# Patient Record
Sex: FEMALE | Race: AMERICAN INDIAN OR ALASKA NATIVE | NOT HISPANIC OR LATINO | Employment: FULL TIME | URBAN - METROPOLITAN AREA
[De-identification: names, ages, dates, MRNs, and addresses within clinical notes are randomized per-mention and may not be internally consistent; named-entity substitution may affect disease eponyms.]

---

## 2017-10-16 ENCOUNTER — HOSPITAL ENCOUNTER (OUTPATIENT)
Dept: RADIOLOGY | Facility: MEDICAL CENTER | Age: 47
End: 2017-10-16
Attending: FAMILY MEDICINE
Payer: COMMERCIAL

## 2017-10-16 DIAGNOSIS — Z12.31 ENCOUNTER FOR SCREENING MAMMOGRAM FOR HIGH-RISK PATIENT: ICD-10-CM

## 2017-10-16 PROCEDURE — G0202 SCR MAMMO BI INCL CAD: HCPCS

## 2017-10-20 ENCOUNTER — HOSPITAL ENCOUNTER (OUTPATIENT)
Dept: RADIOLOGY | Facility: MEDICAL CENTER | Age: 47
End: 2017-10-20
Attending: FAMILY MEDICINE
Payer: COMMERCIAL

## 2017-10-20 DIAGNOSIS — E04.9 NON-TOXIC GOITER: ICD-10-CM

## 2017-10-20 PROCEDURE — 76536 US EXAM OF HEAD AND NECK: CPT

## 2018-01-05 ENCOUNTER — OFFICE VISIT (OUTPATIENT)
Dept: ENDOCRINOLOGY | Facility: MEDICAL CENTER | Age: 48
End: 2018-01-05
Payer: COMMERCIAL

## 2018-01-05 VITALS
HEART RATE: 100 BPM | OXYGEN SATURATION: 89 % | DIASTOLIC BLOOD PRESSURE: 86 MMHG | WEIGHT: 213.2 LBS | BODY MASS INDEX: 34.27 KG/M2 | SYSTOLIC BLOOD PRESSURE: 126 MMHG | HEIGHT: 66 IN

## 2018-01-05 DIAGNOSIS — E53.8 VITAMIN B12 DEFICIENCY: ICD-10-CM

## 2018-01-05 DIAGNOSIS — E55.9 VITAMIN D DEFICIENCY: ICD-10-CM

## 2018-01-05 DIAGNOSIS — E03.9 HYPOTHYROIDISM, UNSPECIFIED TYPE: ICD-10-CM

## 2018-01-05 DIAGNOSIS — E11.65 UNCONTROLLED TYPE 2 DIABETES MELLITUS WITH HYPERGLYCEMIA, WITH LONG-TERM CURRENT USE OF INSULIN (HCC): ICD-10-CM

## 2018-01-05 DIAGNOSIS — Z79.4 UNCONTROLLED TYPE 2 DIABETES MELLITUS WITH HYPERGLYCEMIA, WITH LONG-TERM CURRENT USE OF INSULIN (HCC): ICD-10-CM

## 2018-01-05 DIAGNOSIS — E04.2 GOITER, NONTOXIC, MULTINODULAR: ICD-10-CM

## 2018-01-05 LAB
HBA1C MFR BLD: 8.2 % (ref ?–5.8)
INT CON NEG: NORMAL
INT CON POS: NORMAL

## 2018-01-05 PROCEDURE — 83036 HEMOGLOBIN GLYCOSYLATED A1C: CPT | Performed by: INTERNAL MEDICINE

## 2018-01-05 PROCEDURE — 99205 OFFICE O/P NEW HI 60 MIN: CPT | Performed by: INTERNAL MEDICINE

## 2018-01-05 RX ORDER — DAPAGLIFLOZIN AND METFORMIN HYDROCHLORIDE 5; 1000 MG/1; MG/1
2 TABLET, FILM COATED, EXTENDED RELEASE ORAL DAILY
Qty: 60 TAB | Refills: 3 | COMMUNITY
Start: 2018-01-05 | End: 2018-01-22

## 2018-01-05 NOTE — PROGRESS NOTES
New Patient Consult Note  Primary care physician: Alison Cabrera M.D.    Reason for consult: Multinodular goiter    HPI:  Jud Pascal is a 47 y.o. old patient who comes in today for evaluation of multinodular goiter. Her thyroid ultrasound done in October 2017 shows for subcentimeter thyroid nodules (2 on left and 2 on right). She denies any family history of thyroid cancer. She denies any history of radiation to head and neck area.     She does complain of hair loss progressively getting worse over the period of time. She does have hot flashes on and off which are also progressively getting worse over a period of time. Her menstrual cycles are sporadic now. Most recent menstrual cycle was 3 months ago.  Also complains of mental sluggishness and foggy brain sensation.    She also has uncontrolled type 2 diabetes. She is currently on metformin 2 g per day along with most likely Tresiba as the basal insulin(18 units once daily). She is not very regular in checking her blood sugars but whenever she checks in the fasting it is usually around 180-200  Mg/dl range.    ROS:  Constitutional: Hair loss, hot flashes, No weight loss  Cardiac: No palpitations or racing heart  Resp: No shortness of breath  Neuro: mental sluggishness, foggy brain sensation, No numbness or tinging in feet  Endo: No heat or cold intolerance, no polyuria or polydipsia  All other systems were reviewed and were negative.    Past Medical History:  There are no active problems to display for this patient.      Past Surgical History:  Past Surgical History:   Procedure Laterality Date   • MAMMOPLASTY AUGMENTATION  8/26/2011    Performed by JOSE STEVENSON at SURGERY [facility] ORS   • HYSTEROSCOPY WITH VIDEO OPERATIVE  1/28/2011    Performed by NEAL PATEL at SURGERY [facility] ORS   • CHOLECYSTECTOMY  1991   • PB ENLARGE BREAST WITH IMPLANT         Allergies:  Ibuprofen; Other environmental; and Penicillins    Social  History:  Social History     Social History   • Marital status:      Spouse name: N/A   • Number of children: N/A   • Years of education: N/A     Occupational History   • Not on file.     Social History Main Topics   • Smoking status: Never Smoker   • Smokeless tobacco: Never Used   • Alcohol use Yes      Comment: weekly   occassional   • Drug use: No   • Sexual activity: Not on file     Other Topics Concern   • Not on file     Social History Narrative   • No narrative on file       Family History:  Family History   Problem Relation Age of Onset   • Diabetes     • Heart Disease     • Hypertension     • Cancer Maternal Aunt    • Breast Cancer Mother    • Breast Cancer Sister        Medications:    Current Outpatient Prescriptions:   •  Dulaglutide (TRULICITY) 0.75 MG/0.5ML Solution Pen-injector, Inject 1 PEN as instructed every 7 days., Disp: 4 PEN, Rfl: 6  •  Dapagliflozin-Metformin HCl ER 5-1000 MG TABLET SR 24 HR, Take 2 tablet by mouth every day., Disp: 60 Tab, Rfl: 3  •  lisinopril (PRINIVIL) 5 MG TABS, Take 5 mg by mouth every day., Disp: , Rfl:   •  hydrocodone-acetaminophen (NORCO) 7.5-325 MG per tablet, Take 1-2 Tabs by mouth every 8 hours as needed (pain)., Disp: 20 Tab, Rfl: 0  •  ondansetron (ZOFRAN ODT) 4 MG TBDP, Take 1 Tab by mouth every 8 hours as needed for Nausea/Vomiting., Disp: 20 Tab, Rfl: 0  •  cyclobenzaprine (FLEXERIL) 10 MG TABS, Take 1 Tab by mouth 3 times a day as needed for Mild Pain., Disp: 30 Each, Rfl: 0  •  hydrocodone-acetaminophen (NORCO) 5-325 MG TABS per tablet, Take 1-2 Tabs by mouth every 6 hours as needed., Disp: 20 Each, Rfl: 0  •  vitamin D, Ergocalciferol, (DRISDOL) 79842 UNIT CAPS capsule, Take  by mouth. 3x per week , Disp: , Rfl:   •  Ferrous Sulfate (IRON) 325 (65 FE) MG TABS, Take  by mouth every day. Dose unknown , Disp: , Rfl:   •  Ascorbic Acid (VITAMIN C PO), Take 1 Tab by mouth., Disp: , Rfl:   •  CALCIUM-VITAMIN D PO, Take 50,000 Units by mouth every Monday,  "Wednesday, and Friday. Indications: 3 times a week, Disp: , Rfl:     Labs: Reviewed    Physical Examination:  Vital signs: /86   Pulse 100   Ht 1.676 m (5' 6\")   Wt 96.7 kg (213 lb 3.2 oz)   SpO2 89%   BMI 34.41 kg/m²  Body mass index is 34.41 kg/m².  General: No apparent distress, cooperative  Eyes: No scleral icterus or discharge  ENMT: Normal on external inspection of nose, lips, normal thyroid exam  Neck: No abnormal masses on inspection  Resp: Normal effort, clear to auscultation bilaterally   CVS: Regular rate and rhythm, S1 S2 normal, no murmur   Extremities: No edema  Abdomen: abdominal obesity present  Neuro: Alert and oriented  Skin: No rash  Psych: Normal mood and affect, intact memory and able to make informed decisions    Assessment and Plan:    1. Hypothyroidism, unspecified type  Ago detailed thyroid panel to rule out hypothyroidism as per below.  - FREE THYROXINE; Future  - TSH; Future  - TRIIDOTHYRONINE; Future  - TRIIDOTHYRONINE; Future  - THYROID PEROXIDASE  (TPO) AB; Future    2. Vitamin D deficiency  Rule out vitamin D deficiency as the contributory factor for her hair loss.  - VITAMIN D,25 HYDROXY; Future    3. Vitamin B12 deficiency  Rule out vitamin B12 deficiency as the contributory factor for alertness.  - VITAMIN B12; Future    4. Uncontrolled type 2 diabetes mellitus with hyperglycemia, with long-term current use of insulin (CMS-HCC)  Her blood sugars are uncontrolled. Start  - Dulaglutide (TRULICITY) 0.75 MGevery 7 days. First dose given in the office.  - Dapagliflozin-Metformin HCl ER 5-1000 MG TABLET SR 24 HR; Take 2 tablet by mouth every day.     Samples of the above medication was also given to the patient.     Stop:  Metformin 1 g twice daily  Reduce tresiba to 10 units sc once daily.    We will plan to gradually titrate down and discontinued insulin over the period of next few days as the blood sugars starts to get better.     5. Goiter, nontoxic, multinodular  No " intervention is required at the current time. We'll repeat ultrasound sometime in April 2018 to monitor the size and progression of these thyroid nodules.    Return in about 2 weeks (around 1/19/2018).    Total face to face time spent with patient equals 60 minutes. 35/60 minutes were spent on counseling the patient about the pathophysiology of pituitary-thyroid axis, natural history of thyroid nodules, role of thyroid hormone, , Vit D and Vit B12.  Also counseled the patient on pathophysiology of type 2 diabetes, 8 core defects and how the chosen regimen attacks all the 8 core defects. Side effects and benefits of the GLP-1 therapy and SGLT-2 inhibition was discussed in detail with the patient.     Thank you for allowing me to participate in the care of this patient.    Chase Stephens M.D.  01/05/18    CC:   Alison Cabrera M.D.    This note was created using voice recognition software (Dragon). The accuracy of the dictation is limited by the abilities of the software. I have reviewed the note prior to signing, however some errors in grammar and context are still possible. If you have any questions related to this note please do not hesitate to contact our office.

## 2018-01-06 LAB
25(OH)D3+25(OH)D2 SERPL-MCNC: 20.5 NG/ML (ref 30–100)
T3FREE SERPL-MCNC: 3.3 PG/ML (ref 2–4.4)
T4 FREE SERPL-MCNC: 1.21 NG/DL (ref 0.82–1.77)
THYROPEROXIDASE AB SERPL-ACNC: 15 IU/ML (ref 0–34)
TSH SERPL DL<=0.005 MIU/L-ACNC: 1.39 UIU/ML (ref 0.45–4.5)
VIT B12 SERPL-MCNC: 609 PG/ML (ref 232–1245)

## 2018-01-22 ENCOUNTER — OFFICE VISIT (OUTPATIENT)
Dept: ENDOCRINOLOGY | Facility: MEDICAL CENTER | Age: 48
End: 2018-01-22
Payer: COMMERCIAL

## 2018-01-22 VITALS
WEIGHT: 208 LBS | BODY MASS INDEX: 33.43 KG/M2 | HEIGHT: 66 IN | HEART RATE: 92 BPM | OXYGEN SATURATION: 97 % | DIASTOLIC BLOOD PRESSURE: 70 MMHG | SYSTOLIC BLOOD PRESSURE: 106 MMHG

## 2018-01-22 DIAGNOSIS — E55.9 VITAMIN D DEFICIENCY: ICD-10-CM

## 2018-01-22 DIAGNOSIS — E04.9 GOITER: ICD-10-CM

## 2018-01-22 DIAGNOSIS — Z79.4 UNCONTROLLED TYPE 2 DIABETES MELLITUS WITH HYPERGLYCEMIA, WITH LONG-TERM CURRENT USE OF INSULIN (HCC): ICD-10-CM

## 2018-01-22 DIAGNOSIS — E11.65 UNCONTROLLED TYPE 2 DIABETES MELLITUS WITH HYPERGLYCEMIA, WITH LONG-TERM CURRENT USE OF INSULIN (HCC): ICD-10-CM

## 2018-01-22 PROCEDURE — 99215 OFFICE O/P EST HI 40 MIN: CPT | Performed by: INTERNAL MEDICINE

## 2018-01-22 RX ORDER — DAPAGLIFLOZIN AND METFORMIN HYDROCHLORIDE 10; 1000 MG/1; MG/1
1 TABLET, FILM COATED, EXTENDED RELEASE ORAL DAILY
Qty: 30 TAB | Refills: 3 | Status: SHIPPED | OUTPATIENT
Start: 2018-01-22 | End: 2018-02-12 | Stop reason: SDUPTHER

## 2018-01-22 RX ORDER — ERGOCALCIFEROL 1.25 MG/1
50000 CAPSULE ORAL
Qty: 12 CAP | Refills: 3 | Status: SHIPPED | OUTPATIENT
Start: 2018-01-22

## 2018-01-22 RX ORDER — M-VIT,TX,IRON,MINS/CALC/FOLIC 27MG-0.4MG
1 TABLET ORAL DAILY
COMMUNITY

## 2018-01-22 NOTE — PROGRESS NOTES
Endocrinology Clinic Progress Note  PCP: Alison Cabrera M.D.    HPI:  Jud Pascal is a 47 y.o. old patient who comes in today for routine follow up of Management of Uncontrolled Type 2 Diabetes and multinodular goiter    HPI:  Jud Pascal is a 47 y.o. old patient who comes in today for evaluation of above stated problem.    Most Recent HbA1c:   Lab Results   Component Value Date/Time    HBA1C 8.2 01/05/2018 09:48 AM        Current Diabetes Regimen:  GLP-1 Agent: Dulaglutide 0.75 mg once weekly   Metformin + SGLT-2 Inhibitor: Xigduo XR 5/1000 mg 2 daily   Soliqua 10 units at bedtime  Testing blood sugars three times daily.  Did not bring log book but states running 118-130  Hypoglycemia:  None    ROS:  Constitutional: She has lost 5 pounds in the last 2 weeks.  Cardiac: No palpitations or racing heart  Resp: No shortness of breath  Neuro: No numbness or tinging in feet  Endo: No heat or cold intolerance, no polyuria or polydipsia  All other systems were reviewed and were negative.    Past Medical History:  There are no active problems to display for this patient.      Past Surgical History:  Past Surgical History:   Procedure Laterality Date   • MAMMOPLASTY AUGMENTATION  8/26/2011    Performed by JOSE STEVENSON at SURGERY [facility] ORS   • HYSTEROSCOPY WITH VIDEO OPERATIVE  1/28/2011    Performed by NEAL PATEL at SURGERY [facility] ORS   • CHOLECYSTECTOMY  1991   • PB ENLARGE BREAST WITH IMPLANT         Allergies:  Ibuprofen; Other environmental; and Penicillins    Social History:  Social History     Social History   • Marital status:      Spouse name: N/A   • Number of children: N/A   • Years of education: N/A     Occupational History   • Not on file.     Social History Main Topics   • Smoking status: Never Smoker   • Smokeless tobacco: Never Used   • Alcohol use Yes      Comment: weekly   occassional   • Drug use: No   • Sexual activity: Not on file     Other Topics Concern  "  • Not on file     Social History Narrative   • No narrative on file       Family History:  Family History   Problem Relation Age of Onset   • Diabetes     • Heart Disease     • Hypertension     • Cancer Maternal Aunt    • Breast Cancer Mother    • Breast Cancer Sister        Medications:    Current Outpatient Prescriptions:   •  Cholecalciferol (VITAMIN D3) 5000 units Cap, Take 1 Cap by mouth every day., Disp: , Rfl:   •  therapeutic multivitamin-minerals (THERAGRAN-M) Tab, Take 1 Tab by mouth every day., Disp: , Rfl:   •  Dapagliflozin-Metformin HCl ER (XIGDUO XR)  MG TABLET SR 24 HR, Take 1 tablet by mouth every day., Disp: 30 Tab, Rfl: 3  •  vitamin D, Ergocalciferol, (DRISDOL) 81274 units Cap capsule, Take 1 Cap by mouth every 7 days., Disp: 12 Cap, Rfl: 3  •  Dulaglutide (TRULICITY) 0.75 MG/0.5ML Solution Pen-injector, Inject 1 PEN as instructed every 7 days., Disp: 4 PEN, Rfl: 6  •  Ferrous Sulfate (IRON) 325 (65 FE) MG TABS, Take  by mouth every day. Dose unknown , Disp: , Rfl:   •  lisinopril (PRINIVIL) 5 MG TABS, Take 5 mg by mouth every day., Disp: , Rfl:   •  cyclobenzaprine (FLEXERIL) 10 MG TABS, Take 1 Tab by mouth 3 times a day as needed for Mild Pain., Disp: 30 Each, Rfl: 0    Labs: Reviewed    Physical Examination:  Vital signs: /70   Pulse 92   Ht 1.676 m (5' 6\")   Wt 94.3 kg (208 lb)   SpO2 97%   BMI 33.57 kg/m²  Body mass index is 33.57 kg/m².  General: No apparent distress, cooperative  Eyes: No scleral icterus or discharge  ENMT: Normal on external inspection of nose, lips, normal thyroid exam  Neck: No abnormal masses on inspection  Resp: Normal effort, clear to auscultation bilaterally   CVS: Regular rate and rhythm, S1 S2 normal, no murmur   Extremities: No edema  Abdomen: abdominal obesity present  Neuro: Alert and oriented  Skin: No rash  Psych: Normal mood and affect, intact memory and able to make informed decisions    Foot Exam:  Monofilament: done  Monofilament " testing with a 10 gram force: sensation intact: intact bilaterally  Visual Inspection: Feet without maceration, ulcers, fissures.  Pedal pulses: intact bilaterally    Assessment and Plan:    1. Uncontrolled type 2 diabetes mellitus without complication, without long-term current use of insulin (CMS-[facility])  Continue current regimen. Can stop soliqua. Her blood sugars are likely to be well controlled on trulicity and xigduo only. Will consider increasing the dose of trulicity in next visit to 1.5 mg weekly if she tolerates 0.75 mg weekly well.     - Discussed diabetic diet discussed in detail-plate method.  - She will test before meals and log   - She will walk for 20-30 minutes daily.  - Reviewed medications and advised how to take   - Discussed importance of immunizations and yearly eye exams.   - Encouraged patient to attend diabetes education program.  - Advised daily foot exams. Educated on signs of infection.   - Educational material distributed.   - Educated on need to stay well hydrated with water.  - Educated to call with any questions or problems.    2. Goiter  Stable.     3. HTN: well-controlled; will consider stopping lisinopril in next visit if her BP stays good. She has no other indication for taking lisinopril as she has no evidence of micro-microalbuminuria.    Return in about 2 weeks (around 2/5/2018).    Total face to face time spent with patient equals 40 minutes. 30/40 minutes were spent on counseling the patient about the mechanism of action, side effects and benefits of GLP-1 therapy(specifically trulicity as she had some questions or concerns),  SGLT-Inhibitor therapy again. I had similar discussion in her first visit with me but it seems that she did not remember much from that visit. I also counseled the patient on hypoglycemia recognition and management. She had missed her dose of trulicity as she was awaiting some clarification, so we gave her due dose in the office.     Thank you for allowing  me to participate in the care of this patient.    Dr. Chase Stephens  01/22/18    CC:   Alison Cabrera M.D.    This note was created using voice recognition software (Dragon). The accuracy of the dictation is limited by the abilities of the software. I have reviewed the note prior to signing, however some errors in grammar and context are still possible. If you have any questions related to this note please do not hesitate to contact our office.

## 2018-01-24 ENCOUNTER — TELEPHONE (OUTPATIENT)
Dept: ENDOCRINOLOGY | Facility: MEDICAL CENTER | Age: 48
End: 2018-01-24

## 2018-02-12 ENCOUNTER — OFFICE VISIT (OUTPATIENT)
Dept: ENDOCRINOLOGY | Facility: MEDICAL CENTER | Age: 48
End: 2018-02-12
Payer: COMMERCIAL

## 2018-02-12 VITALS
WEIGHT: 208 LBS | BODY MASS INDEX: 33.43 KG/M2 | OXYGEN SATURATION: 95 % | SYSTOLIC BLOOD PRESSURE: 122 MMHG | HEIGHT: 66 IN | DIASTOLIC BLOOD PRESSURE: 80 MMHG | HEART RATE: 94 BPM

## 2018-02-12 DIAGNOSIS — E55.9 VITAMIN D DEFICIENCY: ICD-10-CM

## 2018-02-12 DIAGNOSIS — E11.9 TYPE 2 DIABETES MELLITUS WITHOUT COMPLICATION, WITHOUT LONG-TERM CURRENT USE OF INSULIN (HCC): ICD-10-CM

## 2018-02-12 DIAGNOSIS — Z79.4 UNCONTROLLED TYPE 2 DIABETES MELLITUS WITH HYPERGLYCEMIA, WITH LONG-TERM CURRENT USE OF INSULIN (HCC): ICD-10-CM

## 2018-02-12 DIAGNOSIS — E11.65 UNCONTROLLED TYPE 2 DIABETES MELLITUS WITH HYPERGLYCEMIA, WITH LONG-TERM CURRENT USE OF INSULIN (HCC): ICD-10-CM

## 2018-02-12 PROCEDURE — 99214 OFFICE O/P EST MOD 30 MIN: CPT | Performed by: INTERNAL MEDICINE

## 2018-02-12 RX ORDER — DAPAGLIFLOZIN AND METFORMIN HYDROCHLORIDE 10; 1000 MG/1; MG/1
1 TABLET, FILM COATED, EXTENDED RELEASE ORAL DAILY
Qty: 30 TAB | Refills: 3 | Status: SHIPPED | OUTPATIENT
Start: 2018-02-12 | End: 2018-03-27 | Stop reason: CLARIF

## 2018-02-12 NOTE — PROGRESS NOTES
Endocrinology Clinic Progress Note  PCP: Alison Cabrera M.D.    HPI:  Jud Pascal is a 47 y.o. old patient who comes in today for routine follow up of Management of Uncontrolled Type 2 Diabetes     HPI:  Jud Pascal is a 47 y.o. old patient who comes in today for evaluation of above stated problem.    Most Recent HbA1c:   Lab Results   Component Value Date/Time    HBA1C 8.2 01/05/2018 09:48 AM        Current Diabetes Regimen:  GLP-1 Agent: Dulaglutide 0.75 mg once weekly     Metformin + SGLT-2 Inhibitor: dapagliflozin-metformin XR(10/1000 mg) once daily.   Testing blood sugar 2-3 times daily    Before Breakfast:  130's    After Lunch:  124-128    Before Bedtime:  130's    Hypoglycemia:  None    ROS:  Constitutional: No weight loss  Cardiac: No palpitations or racing heart  Resp: No shortness of breath  Neuro: No numbness or tinging in feet  Endo: No heat or cold intolerance, no polyuria or polydipsia  All other systems were reviewed and were negative.    Past Medical History:  There are no active problems to display for this patient.      Past Surgical History:  Past Surgical History:   Procedure Laterality Date   • MAMMOPLASTY AUGMENTATION  8/26/2011    Performed by JOSE STEVENSON at SURGERY [facility] ORS   • HYSTEROSCOPY WITH VIDEO OPERATIVE  1/28/2011    Performed by NEAL PATEL at SURGERY [facility] ORS   • CHOLECYSTECTOMY  1991   • PB ENLARGE BREAST WITH IMPLANT         Allergies:  Ibuprofen; Other environmental; and Penicillins    Social History:  Social History     Social History   • Marital status:      Spouse name: N/A   • Number of children: N/A   • Years of education: N/A     Occupational History   • Not on file.     Social History Main Topics   • Smoking status: Never Smoker   • Smokeless tobacco: Never Used   • Alcohol use Yes      Comment: weekly   occassional   • Drug use: No   • Sexual activity: Not on file     Other Topics Concern   • Not on file     Social  "History Narrative   • No narrative on file       Family History:  Family History   Problem Relation Age of Onset   • Diabetes     • Heart Disease     • Hypertension     • Cancer Maternal Aunt    • Breast Cancer Mother    • Breast Cancer Sister        Medications:    Current Outpatient Prescriptions:   •  Dulaglutide (TRULICITY) 1.5 MG/0.5ML Solution Pen-injector, Inject 0.5 mL as instructed every 7 days., Disp: 4 PEN, Rfl: 6  •  Dapagliflozin-Metformin HCl ER (XIGDUO XR)  MG TABLET SR 24 HR, Take 1 tablet by mouth every day., Disp: 30 Tab, Rfl: 3  •  therapeutic multivitamin-minerals (THERAGRAN-M) Tab, Take 1 Tab by mouth every day., Disp: , Rfl:   •  vitamin D, Ergocalciferol, (DRISDOL) 79490 units Cap capsule, Take 1 Cap by mouth every 7 days., Disp: 12 Cap, Rfl: 3  •  lisinopril (PRINIVIL) 5 MG TABS, Take 5 mg by mouth every day., Disp: , Rfl:   •  cyclobenzaprine (FLEXERIL) 10 MG TABS, Take 1 Tab by mouth 3 times a day as needed for Mild Pain., Disp: 30 Each, Rfl: 0    Labs: Reviewed    Physical Examination:  Vital signs: /80   Pulse 94   Ht 1.676 m (5' 6\")   Wt 94.3 kg (208 lb)   SpO2 95%   BMI 33.57 kg/m²  Body mass index is 33.57 kg/m².  General: No apparent distress, cooperative  Eyes: No scleral icterus or discharge  ENMT: Normal on external inspection of nose, lips, normal thyroid exam  Neck: No abnormal masses on inspection  Resp: Normal effort, clear to auscultation bilaterally   CVS: Regular rate and rhythm, S1 S2 normal, no murmur   Extremities: No edema  Abdomen: abdominal obesity present  Neuro: Alert and oriented  Skin: No rash  Psych: Normal mood and affect, intact memory and able to make informed decisions    Foot Exam:  Monofilament: done  Monofilament testing with a 10 gram force: sensation intact: intact bilaterally  Visual Inspection: Feet without maceration, ulcers, fissures.  Pedal pulses: intact bilaterally    Assessment and Plan:    1. Type 2 diabetes mellitus without " complication, without long-term current use of insulin (CMS-HCC)  Uncontrolled; increase trulicity to 1.5 mg once weekly; first dose was given by  Patient in the office and I supervised it. Her injection technique is good.    -The following was discussed by Iliana Bangura RN CDE  - Discussed diabetic diet discussed in detail-plate method.  She understands that her appetite will decrease with the increase in Trulicity and she will need to eat less.  - She will test before meals and log .  - She will walk for 20-30 minutes daily.  - Reviewed medications and advised how to take. Her Xigduo was denied.  She will call her insurance company to find out why or if she needs to be switched to a different SGLT 2 inhibitor.  - Discussed importance of immunizations and yearly eye exams.   - Encouraged patient to attend diabetes education program.   - Advised daily foot exams. Educated on signs of infection.   - Educational material distributed.   - Educated on need to stay well hydrated with water.  - Educated to call with any questions or problems.    2. Goiter: stable.    3. Hypothyroidism: ruled out.     4. Vit d def: on vitamin D supplementation. Her energy levels have improved a lot.    Return in about 3 weeks (around 3/5/2018).    Thank you for allowing me to participate in the care of this patient.    Dr. Chase Stephens  02/12/18    CC:   Alison Cabrera M.D.    This note was created using voice recognition software (Dragon). The accuracy of the dictation is limited by the abilities of the software. I have reviewed the note prior to signing, however some errors in grammar and context are still possible. If you have any questions related to this note please do not hesitate to contact our office.

## 2018-03-15 ENCOUNTER — TELEPHONE (OUTPATIENT)
Dept: ENDOCRINOLOGY | Facility: MEDICAL CENTER | Age: 48
End: 2018-03-15

## 2018-03-27 ENCOUNTER — OFFICE VISIT (OUTPATIENT)
Dept: ENDOCRINOLOGY | Facility: MEDICAL CENTER | Age: 48
End: 2018-03-27
Payer: COMMERCIAL

## 2018-03-27 VITALS
HEIGHT: 66 IN | HEART RATE: 91 BPM | BODY MASS INDEX: 33.11 KG/M2 | WEIGHT: 206 LBS | OXYGEN SATURATION: 96 % | SYSTOLIC BLOOD PRESSURE: 112 MMHG | DIASTOLIC BLOOD PRESSURE: 70 MMHG

## 2018-03-27 DIAGNOSIS — E11.65 UNCONTROLLED TYPE 2 DIABETES MELLITUS WITH HYPERGLYCEMIA, WITH LONG-TERM CURRENT USE OF INSULIN (HCC): ICD-10-CM

## 2018-03-27 DIAGNOSIS — E55.9 VITAMIN D DEFICIENCY: ICD-10-CM

## 2018-03-27 DIAGNOSIS — Z79.4 UNCONTROLLED TYPE 2 DIABETES MELLITUS WITH HYPERGLYCEMIA, WITH LONG-TERM CURRENT USE OF INSULIN (HCC): ICD-10-CM

## 2018-03-27 DIAGNOSIS — E04.2 GOITER, NONTOXIC, MULTINODULAR: ICD-10-CM

## 2018-03-27 LAB
HBA1C MFR BLD: 6.7 % (ref ?–5.8)
INT CON NEG: NORMAL
INT CON POS: NORMAL

## 2018-03-27 PROCEDURE — 99214 OFFICE O/P EST MOD 30 MIN: CPT | Performed by: INTERNAL MEDICINE

## 2018-03-27 PROCEDURE — 83036 HEMOGLOBIN GLYCOSYLATED A1C: CPT | Performed by: INTERNAL MEDICINE

## 2018-03-27 NOTE — PROGRESS NOTES
Endocrinology Clinic Progress Note  PCP: Alison Cabrera M.D.    HPI:  Jud Pascal is a 48 y.o. old patient who comes in today for routine follow up of Management of Uncontrolled Type 2 Diabetes and Vitamin D Deficiency.  States feeling much better and no longer having brain fog or sluggish.    HPI:  Jud Pascal is a 48 y.o. old patient who comes in today for evaluation of above stated problem.    Most Recent HbA1c:   Lab Results   Component Value Date/Time    HBA1C 6.7 03/27/2018 04:08 PM   HBA1C is down from 8.2 on 1/5/18    Current Diabetes Regimen:    GLP-1 Agent: Trulicity 1.5 mg weekly    Metformin + SGLT-2 Inhibitor: dapagliflozin-metformin XR(10/1000 mg) once daily.     Testing blood sugars daily until she ran out of strips. Testing before dinner now every couple days.    Before Lunch:  130's  Before Dinner:  130's  Hypoglycemia:  None    ROS:  Constitutional: intentional weight loss  Cardiac: No palpitations or racing heart  Resp: No shortness of breath  Neuro: No numbness or tinging in feet  Endo: No heat or cold intolerance, no polyuria or polydipsia  All other systems were reviewed and were negative.    Past Medical History:  There are no active problems to display for this patient.      Past Surgical History:  Past Surgical History:   Procedure Laterality Date   • MAMMOPLASTY AUGMENTATION  8/26/2011    Performed by JOSE STEVENSON at SURGERY [facility] ORS   • HYSTEROSCOPY WITH VIDEO OPERATIVE  1/28/2011    Performed by NEAL PATEL at SURGERY [facility] ORS   • CHOLECYSTECTOMY  1991   • PB ENLARGE BREAST WITH IMPLANT         Allergies:  Ibuprofen; Other environmental; and Penicillins    Social History:  Social History     Social History   • Marital status:      Spouse name: N/A   • Number of children: N/A   • Years of education: N/A     Occupational History   • Not on file.     Social History Main Topics   • Smoking status: Never Smoker   • Smokeless tobacco: Never  "Used   • Alcohol use Yes      Comment: weekly   occassional   • Drug use: No   • Sexual activity: Not on file     Other Topics Concern   • Not on file     Social History Narrative   • No narrative on file       Family History:  Family History   Problem Relation Age of Onset   • Diabetes     • Heart Disease     • Hypertension     • Cancer Maternal Aunt    • Breast Cancer Mother    • Breast Cancer Sister        Medications:    Current Outpatient Prescriptions:   •  Empagliflozin-Metformin HCl ER  MG TABLET SR 24 HR, Take 1 tablet by mouth every day., Disp: 30 Tab, Rfl: 3  •  Dulaglutide (TRULICITY) 1.5 MG/0.5ML Solution Pen-injector, Inject 0.5 mL as instructed every 7 days., Disp: 4 PEN, Rfl: 6  •  therapeutic multivitamin-minerals (THERAGRAN-M) Tab, Take 1 Tab by mouth every day., Disp: , Rfl:   •  vitamin D, Ergocalciferol, (DRISDOL) 62250 units Cap capsule, Take 1 Cap by mouth every 7 days., Disp: 12 Cap, Rfl: 3  •  lisinopril (PRINIVIL) 5 MG TABS, Take 5 mg by mouth every day., Disp: , Rfl:     Labs: Reviewed    Physical Examination:  Vital signs: /70   Pulse 91   Ht 1.676 m (5' 6\")   Wt 93.4 kg (206 lb)   SpO2 96%   BMI 33.25 kg/m²  Body mass index is 33.25 kg/m².  General: No apparent distress, cooperative  Eyes: No scleral icterus or discharge  ENMT: Normal on external inspection of nose, lips, normal thyroid exam  Neck: No abnormal masses on inspection  Resp: Normal effort, clear to auscultation bilaterally   CVS: Regular rate and rhythm, S1 S2 normal, no murmur   Extremities: No edema  Abdomen: abdominal obesity present  Neuro: Alert and oriented  Skin: No rash  Psych: Normal mood and affect, intact memory and able to make informed decisions    Foot Exam:  Monofilament: done  Monofilament testing with a 10 gram force: sensation intact: intact bilaterally  Visual Inspection: Feet without maceration, ulcers, fissures.  Pedal pulses: intact bilaterally    Assessment and Plan:    1. " Uncontrolled type 2 diabetes mellitus with hyperglycemia, with long-term current use of insulin (CMS-[facility])  Relatively well controlled although the goal is to attempt to near normal A1c. Advised her to stop the sodas at night especially.     The following was discussed by Iliana Bangura RN CDE  - Discussed diabetic diet discussed in detail-plate method.  - She will test before meals and log .  - She will walk for 20-30 minutes daily.  - Reviewed medications and advised how to take   - Discussed importance of immunizations and yearly eye exams.    - Advised daily foot exams. Educated on signs of infection.   - Educational material distributed.   - Educated on need to stay well hydrated with water.  - Educated to call with any questions or problems.    2. Vitamin D deficiency  continue current supplementation. We'll repeat levels before next appointment.    3. Goiter, nontoxic, multinodular  Stable.    Return in about 2 months (around 5/27/2018).    Thank you for allowing me to participate in the care of this patient.    Dr. Chase Stephens  03/27/18    CC:   Alison Cabrera M.D.    This note was created using voice recognition software (Dragon). The accuracy of the dictation is limited by the abilities of the software. I have reviewed the note prior to signing, however some errors in grammar and context are still possible. If you have any questions related to this note please do not hesitate to contact our office.

## 2018-05-21 ENCOUNTER — APPOINTMENT (OUTPATIENT)
Dept: ENDOCRINOLOGY | Facility: MEDICAL CENTER | Age: 48
End: 2018-05-21
Payer: COMMERCIAL

## 2018-06-11 ENCOUNTER — APPOINTMENT (OUTPATIENT)
Dept: ENDOCRINOLOGY | Facility: MEDICAL CENTER | Age: 48
End: 2018-06-11
Payer: COMMERCIAL

## 2018-06-11 NOTE — PROGRESS NOTES
Endocrinology Clinic Progress Note  PCP: Alison Cabrera M.D.    HPI:  Jud Pascal is a 48 y.o. old patient who comes in today for routine follow up of Management of Uncontrolled Type 2 Diabetes, Vitamin D Deficiency, and Goiter.    HPI:  Jud Pascal is a 48 y.o. old patient who comes in today for evaluation of above stated problem.    Most Recent HbA1c:   Lab Results   Component Value Date/Time    HBA1C 6.7 03/27/2018 04:08 PM        Current Diabetes Regimen:  GLP-1 Agent: Dulaglutide 1.5 mg once weekly  Metformin + SGLT-2 Inhibitor: Synjardy XR  mg daily    Before Breakfast:  Before Lunch:  Before Dinner:  Before Bedtime:  Other times:  Hypoglycemia:  {NDKHYPOGLYCEMIA:20516}    ROS:  Constitutional: No weight loss  Cardiac: No palpitations or racing heart  Resp: No shortness of breath  Neuro: No numbness or tinging in feet  Endo: No heat or cold intolerance, no polyuria or polydipsia  All other systems were reviewed and were negative.    Past Medical History:  There are no active problems to display for this patient.      Past Surgical History:  Past Surgical History:   Procedure Laterality Date   • MAMMOPLASTY AUGMENTATION  8/26/2011    Performed by JOSE STEVENSON at SURGERY [facility] ORS   • HYSTEROSCOPY WITH VIDEO OPERATIVE  1/28/2011    Performed by NEAL PATEL at SURGERY [facility] ORS   • CHOLECYSTECTOMY  1991   • PB ENLARGE BREAST WITH IMPLANT         Allergies:  Ibuprofen; Other environmental; and Penicillins    Social History:  Social History     Social History   • Marital status:      Spouse name: N/A   • Number of children: N/A   • Years of education: N/A     Occupational History   • Not on file.     Social History Main Topics   • Smoking status: Never Smoker   • Smokeless tobacco: Never Used   • Alcohol use Yes      Comment: weekly   occassional   • Drug use: No   • Sexual activity: Not on file     Other Topics Concern   • Not on file     Social History  Narrative   • No narrative on file       Family History:  Family History   Problem Relation Age of Onset   • Diabetes     • Heart Disease     • Hypertension     • Cancer Maternal Aunt    • Breast Cancer Mother    • Breast Cancer Sister        Medications:    Current Outpatient Prescriptions:   •  Empagliflozin-Metformin HCl ER  MG TABLET SR 24 HR, Take 1 tablet by mouth every day., Disp: 30 Tab, Rfl: 3  •  Dulaglutide (TRULICITY) 1.5 MG/0.5ML Solution Pen-injector, Inject 0.5 mL as instructed every 7 days., Disp: 4 PEN, Rfl: 6  •  therapeutic multivitamin-minerals (THERAGRAN-M) Tab, Take 1 Tab by mouth every day., Disp: , Rfl:   •  vitamin D, Ergocalciferol, (DRISDOL) 85839 units Cap capsule, Take 1 Cap by mouth every 7 days., Disp: 12 Cap, Rfl: 3  •  lisinopril (PRINIVIL) 5 MG TABS, Take 5 mg by mouth every day., Disp: , Rfl:     Labs: Reviewed    Physical Examination:  Vital signs: There were no vitals taken for this visit. There is no height or weight on file to calculate BMI.  General: No apparent distress, cooperative  Eyes: No scleral icterus or discharge  ENMT: Normal on external inspection of nose, lips, normal thyroid exam  Neck: No abnormal masses on inspection  Resp: Normal effort, clear to auscultation bilaterally   CVS: Regular rate and rhythm, S1 S2 normal, no murmur   Extremities: No edema  Abdomen: abdominal obesity present  Neuro: Alert and oriented  Skin: No rash  Psych: Normal mood and affect, intact memory and able to make informed decisions    Foot Exam:  Monofilament: {DONE:79576}  Monofilament testing with a 10 gram force: sensation intact: {Sensation:85304780}  Visual Inspection: Feet {w-w/o:5700} maceration, ulcers, fissures.  Pedal pulses: {Pedal Pulses:33848442}    Assessment and Plan:    1. Uncontrolled type 2 diabetes mellitus with hyperglycemia, with long-term current use of insulin (HCC)  ***    2. Vitamin D deficiency  ***    3. Goiter, nontoxic, multinodular  ***      No  Follow-up on file.  The following was reviewed by Iliana Bangura RN CDE  - Discussed diabetic diet discussed in detail-plate method.  - She will test before meals and log ***.  - She will walk for 20-30 minutes daily.  - Reviewed medications and advised how to take ***  - Discussed importance of immunizations and yearly eye exams.   - Encouraged patient to attend diabetes education program. ***  - Advised daily foot exams. Educated on signs of infection.   - Educational material distributed. ***  - Educated on need to stay well hydrated with water.  - Educated to call with any questions or problems.    Total face to face time spent with patient equals 60 minutes. 35/60 minutes were spent on counseling the patient about the mechanism of action, side effects and benefits of GLP-1 therapy, SGLT-Inhibitor therapy. I also counseled the patient on hypoglycemia recognition and management.     Thank you for allowing me to participate in the care of this patient.    Dr. Chase Stephens  06/11/18    CC:   Alison Cabrera M.D.    This note was created using voice recognition software (Dragon). The accuracy of the dictation is limited by the abilities of the software. I have reviewed the note prior to signing, however some errors in grammar and context are still possible. If you have any questions related to this note please do not hesitate to contact our office.

## 2018-08-16 ENCOUNTER — HOSPITAL ENCOUNTER (OUTPATIENT)
Dept: HOSPITAL 8 - STAR | Age: 48
Discharge: HOME | End: 2018-08-16
Attending: SURGERY
Payer: COMMERCIAL

## 2018-08-16 DIAGNOSIS — Z88.8: ICD-10-CM

## 2018-08-16 DIAGNOSIS — L73.1: Primary | ICD-10-CM

## 2018-08-16 DIAGNOSIS — Z88.0: ICD-10-CM

## 2018-08-16 LAB
ALBUMIN SERPL-MCNC: 3.8 G/DL (ref 3.4–5)
ALP SERPL-CCNC: 148 U/L (ref 45–117)
ALT SERPL-CCNC: 39 U/L (ref 12–78)
ANION GAP SERPL CALC-SCNC: 7 MMOL/L (ref 5–15)
BILIRUB SERPL-MCNC: 0.5 MG/DL (ref 0.2–1)
CALCIUM SERPL-MCNC: 8.9 MG/DL (ref 8.5–10.1)
CHLORIDE SERPL-SCNC: 108 MMOL/L (ref 98–107)
CREAT SERPL-MCNC: 0.69 MG/DL (ref 0.55–1.02)
PROT SERPL-MCNC: 8.5 G/DL (ref 6.4–8.2)

## 2018-08-16 PROCEDURE — 80053 COMPREHEN METABOLIC PANEL: CPT

## 2018-08-16 PROCEDURE — 93005 ELECTROCARDIOGRAM TRACING: CPT

## 2018-08-16 PROCEDURE — 36415 COLL VENOUS BLD VENIPUNCTURE: CPT

## 2019-05-10 ENCOUNTER — HOSPITAL ENCOUNTER (OUTPATIENT)
Dept: RADIOLOGY | Facility: MEDICAL CENTER | Age: 49
End: 2019-05-10
Attending: FAMILY MEDICINE
Payer: COMMERCIAL

## 2019-05-10 DIAGNOSIS — Z12.39 SCREENING BREAST EXAMINATION: ICD-10-CM

## 2019-05-10 PROCEDURE — 77063 BREAST TOMOSYNTHESIS BI: CPT

## 2019-07-05 ENCOUNTER — HOSPITAL ENCOUNTER (OUTPATIENT)
Dept: RADIOLOGY | Facility: MEDICAL CENTER | Age: 49
End: 2019-07-05
Attending: FAMILY MEDICINE
Payer: COMMERCIAL

## 2019-07-05 DIAGNOSIS — Z12.39 SCREENING BREAST EXAMINATION: ICD-10-CM

## 2019-07-05 PROCEDURE — 76641 ULTRASOUND BREAST COMPLETE: CPT

## 2019-08-13 ENCOUNTER — TELEPHONE (OUTPATIENT)
Dept: ENDOCRINOLOGY | Facility: MEDICAL CENTER | Age: 49
End: 2019-08-13

## 2019-08-13 NOTE — TELEPHONE ENCOUNTER
VOICEMAIL    1. Caller Name: Jud Pascal                          Call Back Number: [phone] (home)       2. Message: Patient lvm requesting samples of Synjardy XR 25-1000mg.    3. Patient approves office to leave a detailed voicemail/MyChart message: N\A    I spoke with patient on 8/13/19 @7:41am letting her know I put some samples aside for her. She said she will stop by later today for them.

## 2020-06-08 PROBLEM — E11.65 UNCONTROLLED TYPE 2 DIABETES MELLITUS WITH HYPERGLYCEMIA (HCC): Status: ACTIVE | Noted: 2020-06-08

## 2020-06-08 PROBLEM — E55.9 VITAMIN D DEFICIENCY: Status: ACTIVE | Noted: 2020-06-08

## 2020-06-08 NOTE — PROGRESS NOTES
Endocrinology Clinic Progress Note  PCP: Alison Cabrera M.D.    HPI:  Jud Pascal is a 50 y.o. old patient who is seen today for review of her uncontrolled type 2 diabetes and Vitamin D deficiency.   It has been over 2 years since she was last in the office for an appointment.   1. Type 2 diabetes, uncontrolled     Current Diabetes Regimen:  Synjardy 12.5/1000 mg daily  Trulicity 1.5 mg weekly    Most Recent HbA1c:   Lab Results   Component Value Date/Time    HBA1C 8.2 (A) 06/10/2020 04:44 PM   Previous A1c was 6.78 on 0./27/2018     Patient has been testing blood sugars a couple times a week.      Hypoglycemia:  None    Exercise: has started walking around [place] (2 miles) for the last 3 weeks, every other day  Diet: trying to eat healthy, states she still has a regular can of Dr. Pepper at lunch.    Last Retinal Exam: she states it is completed.    Daily Foot Exam: yes denies problems.   Foot Exam:  Monofilament: done  Monofilament testing with a 10 gram force: sensation intact: intact bilateral  Visual Inspection: Feet without maceration, ulcers, fissures.  Pedal pulses: intact bilaterally    2, Vitamin D deficiency: States she has been out of her Vitamin D for some time.  Was previously taking 18715 iu per week.     ROS:  Constitutional: No weight loss  Cardiac: No palpitations or racing heart  Resp: No shortness of breath  Neuro: No numbness or tinging in feet  Endo: No heat or cold intolerance, no polyuria or polydipsia  All other systems were reviewed and were negative.    Past Medical History:  Patient Active Problem List    Diagnosis Date Noted   • Uncontrolled type 2 diabetes mellitus with hyperglycemia (HCC) 06/08/2020   • Vitamin D deficiency 06/08/2020       Past Surgical History:  Past Surgical History:   Procedure Laterality Date   • MAMMOPLASTY AUGMENTATION  8/26/2011    Performed by JOSE STEVENSON at SURGERY [facility]   • HYSTEROSCOPY WITH VIDEO OPERATIVE  1/28/2011     Performed by NEAL PATEL at SURGERY [facility] ORS   • CHOLECYSTECTOMY  1991   • PB ENLARGE BREAST WITH IMPLANT         Allergies:  Ibuprofen; Other environmental; and Penicillins    Social History:  Social History     Socioeconomic History   • Marital status:      Spouse name: Not on file   • Number of children: Not on file   • Years of education: Not on file   • Highest education level: Not on file   Occupational History   • Not on file   Social Needs   • Financial resource strain: Not on file   • Food insecurity     Worry: Not on file     Inability: Not on file   • Transportation needs     Medical: Not on file     Non-medical: Not on file   Tobacco Use   • Smoking status: Never Smoker   • Smokeless tobacco: Never Used   Substance and Sexual Activity   • Alcohol use: Yes     Comment: weekly   occassional   • Drug use: No   • Sexual activity: Not on file   Lifestyle   • Physical activity     Days per week: Not on file     Minutes per session: Not on file   • Stress: Not on file   Relationships   • Social connections     Talks on phone: Not on file     Gets together: Not on file     Attends Christianity service: Not on file     Active member of club or organization: Not on file     Attends meetings of clubs or organizations: Not on file     Relationship status: Not on file   • Intimate partner violence     Fear of current or ex partner: Not on file     Emotionally abused: Not on file     Physically abused: Not on file     Forced sexual activity: Not on file   Other Topics Concern   • Not on file   Social History Narrative   • Not on file       Family History:  Family History   Problem Relation Age of Onset   • Diabetes Unknown    • Heart Disease Unknown    • Hypertension Unknown    • Cancer Maternal Aunt    • Breast Cancer Mother    • Breast Cancer Sister        Medications:    Current Outpatient Medications:   •  Empagliflozin-Metformin HCl ER  MG TABLET SR 24 HR, Take 1 tablet by mouth every  "day., Disp: 30 Tab, Rfl: 3  •  Dulaglutide (TRULICITY) 1.5 MG/0.5ML Solution Pen-injector, Inject 0.5 mL as instructed every 7 days., Disp: 4 PEN, Rfl: 6  •  therapeutic multivitamin-minerals (THERAGRAN-M) Tab, Take 1 Tab by mouth every day., Disp: , Rfl:   •  lisinopril (PRINIVIL) 20 MG Tab, Take 20 mg by mouth., Disp: , Rfl:   •  vitamin D, Ergocalciferol, (DRISDOL) 30031 units Cap capsule, Take 1 Cap by mouth every 7 days. (Patient not taking: Reported on 6/10/2020), Disp: 12 Cap, Rfl: 3    Labs: Reviewed    Physical Examination:  Vital signs: /80 (BP Location: Left arm, Patient Position: Sitting, BP Cuff Size: Adult)   Pulse (!) 104   Resp 14   Ht 1.676 m (5' 6\")   Wt 96.2 kg (212 lb)   SpO2 98%   BMI 34.22 kg/m²  Body mass index is 34.22 kg/m².  General: No apparent distress, cooperative  Eyes: No scleral icterus or discharge  ENMT: Normal on external inspection of nose, lips  Neck: No abnormal masses on inspection  Resp: Normal effort, clear to auscultation bilaterally   CVS: Regular rate and rhythm, S1 S2 normal, no murmur   Extremities: No edema  Abdomen: abdominal obesity present  Neuro: Alert and oriented  Skin: No rash  Psych: Normal mood and affect, intact memory and able to make informed decisions    Assessment and Plan:  1. Uncontrolled type 2 diabetes mellitus with hyperglycemia (HCC)  Continue current regimen.  Patient is now getting back into a prior lifestyle which along with the current regimen facilitated an A1c below 7%.  Similar lifestyle changes is likely to get her down again.  - POCT Hemoglobin A1C  - Diabetic Monofilament LE Exam  - Lipid Profile; Future  - Comp Metabolic Panel; Future  - MICROALBUMIN CREAT RATIO URINE; Future    2. Vitamin D deficiency  Continue vit D with food as per HPI  - VITAMIN D,25 HYDROXY; Future    3. Vitamin B12 deficiency  Continue B 12.   - VITAMIN B12; Future    Return in about 3 months (around 9/10/2020).    Thank you for allowing me to participate " in the care of this patient.    Chase Stephens M.D.  06/08/20    CC:   Alison Cabrera M.D.    This note was created using voice recognition software (Dragon). The accuracy of the dictation is limited by the abilities of the software. I have reviewed the note prior to signing, however some errors in grammar and context are still possible. If you have any questions related to this note please do not hesitate to contact our office.

## 2020-06-10 ENCOUNTER — OFFICE VISIT (OUTPATIENT)
Dept: ENDOCRINOLOGY | Facility: MEDICAL CENTER | Age: 50
End: 2020-06-10
Payer: COMMERCIAL

## 2020-06-10 VITALS
WEIGHT: 212 LBS | HEIGHT: 66 IN | SYSTOLIC BLOOD PRESSURE: 114 MMHG | DIASTOLIC BLOOD PRESSURE: 80 MMHG | OXYGEN SATURATION: 98 % | BODY MASS INDEX: 34.07 KG/M2 | RESPIRATION RATE: 14 BRPM | HEART RATE: 104 BPM

## 2020-06-10 DIAGNOSIS — E53.8 VITAMIN B12 DEFICIENCY: ICD-10-CM

## 2020-06-10 DIAGNOSIS — E11.65 UNCONTROLLED TYPE 2 DIABETES MELLITUS WITH HYPERGLYCEMIA (HCC): ICD-10-CM

## 2020-06-10 DIAGNOSIS — E55.9 VITAMIN D DEFICIENCY: ICD-10-CM

## 2020-06-10 LAB
HBA1C MFR BLD: 8.2 % (ref 0–5.6)
INT CON NEG: ABNORMAL
INT CON POS: ABNORMAL

## 2020-06-10 PROCEDURE — 99214 OFFICE O/P EST MOD 30 MIN: CPT | Performed by: INTERNAL MEDICINE

## 2020-06-10 RX ORDER — LISINOPRIL 20 MG/1
20 TABLET ORAL
COMMUNITY
Start: 2020-05-13

## 2020-08-21 ENCOUNTER — TELEPHONE (OUTPATIENT)
Dept: ENDOCRINOLOGY | Facility: MEDICAL CENTER | Age: 50
End: 2020-08-21

## 2020-08-21 ENCOUNTER — TELEPHONE (OUTPATIENT)
Dept: HEALTH INFORMATION MANAGEMENT | Facility: MEDICAL CENTER | Age: 50
End: 2020-08-21

## 2020-08-21 NOTE — TELEPHONE ENCOUNTER
Patient is requesting samples of Synjardy 25/1000. Dr. Stephens mentioned to her at her last visit to call in when she needs more samples.

## 2020-08-21 NOTE — TELEPHONE ENCOUNTER
Phone Number Called: [phone]    Call outcome: Spoke to patient regarding message below.    Message: Contacted patient and she will stop by on Monday to get some samples of Synjardy. They were set aside for her.

## 2020-09-04 ENCOUNTER — APPOINTMENT (OUTPATIENT)
Dept: ENDOCRINOLOGY | Facility: MEDICAL CENTER | Age: 50
End: 2020-09-04
Attending: INTERNAL MEDICINE
Payer: COMMERCIAL

## 2020-11-13 ENCOUNTER — HOSPITAL ENCOUNTER (OUTPATIENT)
Dept: LAB | Facility: MEDICAL CENTER | Age: 50
End: 2020-11-13
Attending: FAMILY MEDICINE
Payer: COMMERCIAL

## 2020-11-13 LAB — COVID ORDER STATUS COVID19: NORMAL

## 2020-11-13 PROCEDURE — C9803 HOPD COVID-19 SPEC COLLECT: HCPCS

## 2020-11-13 PROCEDURE — U0003 INFECTIOUS AGENT DETECTION BY NUCLEIC ACID (DNA OR RNA); SEVERE ACUTE RESPIRATORY SYNDROME CORONAVIRUS 2 (SARS-COV-2) (CORONAVIRUS DISEASE [COVID-19]), AMPLIFIED PROBE TECHNIQUE, MAKING USE OF HIGH THROUGHPUT TECHNOLOGIES AS DESCRIBED BY CMS-2020-01-R: HCPCS

## 2020-11-14 LAB
SARS-COV-2 RNA RESP QL NAA+PROBE: NOTDETECTED
SPECIMEN SOURCE: NORMAL

## 2020-12-08 ENCOUNTER — HOSPITAL ENCOUNTER (OUTPATIENT)
Dept: RADIOLOGY | Facility: MEDICAL CENTER | Age: 50
End: 2020-12-08
Attending: FAMILY MEDICINE
Payer: COMMERCIAL

## 2020-12-08 DIAGNOSIS — Z12.31 VISIT FOR SCREENING MAMMOGRAM: ICD-10-CM

## 2020-12-08 PROCEDURE — 77067 SCR MAMMO BI INCL CAD: CPT

## 2022-03-28 NOTE — TELEPHONE ENCOUNTER
Jud calls requesting Synjardy samples  
Samples provided to patient please see telephone encounter 08/21/2020  
room air

## 2022-09-29 ENCOUNTER — HOSPITAL ENCOUNTER (OUTPATIENT)
Dept: RADIOLOGY | Facility: MEDICAL CENTER | Age: 52
End: 2022-09-29
Attending: FAMILY MEDICINE
Payer: COMMERCIAL

## 2022-09-29 DIAGNOSIS — Z12.31 ENCOUNTER FOR MAMMOGRAM TO ESTABLISH BASELINE MAMMOGRAM: ICD-10-CM

## 2022-09-29 PROCEDURE — 77063 BREAST TOMOSYNTHESIS BI: CPT

## 2023-10-02 ENCOUNTER — APPOINTMENT (OUTPATIENT)
Dept: RADIOLOGY | Facility: MEDICAL CENTER | Age: 53
End: 2023-10-02
Attending: FAMILY MEDICINE
Payer: COMMERCIAL